# Patient Record
Sex: FEMALE | Race: WHITE | NOT HISPANIC OR LATINO | Employment: UNEMPLOYED | URBAN - METROPOLITAN AREA
[De-identification: names, ages, dates, MRNs, and addresses within clinical notes are randomized per-mention and may not be internally consistent; named-entity substitution may affect disease eponyms.]

---

## 2023-10-16 ENCOUNTER — TELEPHONE (OUTPATIENT)
Dept: GASTROENTEROLOGY | Facility: HOSPITAL | Age: 63
End: 2023-10-16

## 2024-01-08 PROBLEM — R42 DIZZINESS: Status: ACTIVE | Noted: 2022-06-17

## 2024-01-08 PROBLEM — R74.01 TRANSAMINITIS: Status: ACTIVE | Noted: 2022-06-17

## 2024-01-08 PROBLEM — M48.062 LUMBAR STENOSIS WITH NEUROGENIC CLAUDICATION: Status: ACTIVE | Noted: 2023-02-08

## 2024-01-08 PROBLEM — E87.1 HYPONATREMIA: Status: ACTIVE | Noted: 2022-06-17

## 2024-01-08 PROBLEM — I85.00 ESOPHAGEAL VARICES (MULTI): Status: ACTIVE | Noted: 2022-10-04

## 2024-01-08 PROBLEM — R00.1 BRADYCARDIA: Status: ACTIVE | Noted: 2022-06-17

## 2024-01-08 PROBLEM — R07.9 CHEST PAIN: Status: ACTIVE | Noted: 2022-06-17

## 2024-01-08 PROBLEM — R91.1 PULMONARY NODULE: Status: ACTIVE | Noted: 2024-01-08

## 2024-01-08 PROBLEM — K70.30 ALCOHOLIC CIRRHOSIS (MULTI): Status: ACTIVE | Noted: 2022-06-14

## 2024-01-08 PROBLEM — M72.2 PLANTAR FASCIITIS, BILATERAL: Status: ACTIVE | Noted: 2021-07-30

## 2024-01-08 RX ORDER — RIFAXIMIN 550 MG/1
TABLET ORAL
COMMUNITY

## 2024-01-08 RX ORDER — LANOLIN ALCOHOL/MO/W.PET/CERES
100 CREAM (GRAM) TOPICAL DAILY
COMMUNITY
Start: 2011-04-29

## 2024-01-08 RX ORDER — POTASSIUM CHLORIDE 750 MG/1
10 TABLET, FILM COATED, EXTENDED RELEASE ORAL
COMMUNITY
Start: 2011-04-29

## 2024-01-08 RX ORDER — SPIRONOLACTONE 50 MG/1
50 TABLET, FILM COATED ORAL
COMMUNITY
Start: 2011-04-29

## 2024-01-08 RX ORDER — ASCORBIC ACID 500 MG
500 TABLET ORAL
COMMUNITY

## 2024-01-08 RX ORDER — RIBOFLAVIN (VITAMIN B2) 400 MG
TABLET ORAL
COMMUNITY
Start: 2021-07-13

## 2024-01-08 RX ORDER — LANOLIN ALCOHOL/MO/W.PET/CERES
CREAM (GRAM) TOPICAL EVERY 24 HOURS
COMMUNITY

## 2024-01-08 RX ORDER — PANTOPRAZOLE SODIUM 40 MG/1
40 TABLET, DELAYED RELEASE ORAL DAILY
COMMUNITY
Start: 2023-07-13

## 2024-01-08 RX ORDER — PAROXETINE 10 MG/1
TABLET, FILM COATED ORAL
COMMUNITY
Start: 2021-02-08

## 2024-01-09 ENCOUNTER — APPOINTMENT (OUTPATIENT)
Dept: GASTROENTEROLOGY | Facility: CLINIC | Age: 64
End: 2024-01-09
Payer: MEDICARE

## 2024-01-09 ENCOUNTER — OFFICE VISIT (OUTPATIENT)
Dept: GASTROENTEROLOGY | Facility: CLINIC | Age: 64
End: 2024-01-09
Payer: MEDICARE

## 2024-01-09 VITALS
WEIGHT: 145 LBS | OXYGEN SATURATION: 100 % | SYSTOLIC BLOOD PRESSURE: 125 MMHG | BODY MASS INDEX: 25.69 KG/M2 | HEART RATE: 56 BPM | DIASTOLIC BLOOD PRESSURE: 60 MMHG | HEIGHT: 63 IN

## 2024-01-09 DIAGNOSIS — K70.30 ALCOHOLIC CIRRHOSIS OF LIVER WITHOUT ASCITES (MULTI): ICD-10-CM

## 2024-01-09 PROCEDURE — 1159F MED LIST DOCD IN RCRD: CPT | Performed by: INTERNAL MEDICINE

## 2024-01-09 PROCEDURE — 99214 OFFICE O/P EST MOD 30 MIN: CPT | Performed by: INTERNAL MEDICINE

## 2024-01-09 PROCEDURE — 1126F AMNT PAIN NOTED NONE PRSNT: CPT | Performed by: INTERNAL MEDICINE

## 2024-01-09 RX ORDER — CALCIUM CARBONATE/VITAMIN D3 500-10/5ML
400 LIQUID (ML) ORAL DAILY
COMMUNITY

## 2024-01-09 NOTE — PATIENT INSTRUCTIONS
Try increasing the Kristalose to 3 times a day and let us know in a few weeks if that helps with the memory issues.  You can call [phone].    If you have issues getting your rifaximin, you can call the same number.    Get labs in 3 months and in 6 months.    Get an ultrasound in 6 months.    See me back in clinic in about 7 months.

## 2024-01-09 NOTE — PROGRESS NOTES
HEPATOLOGY RETURN PATIENT VISIT    January 9, 2024      Dr. Jose Antonio Zayas      Patient Name:     MARINA GABRIEL  Medical Record Number:  [id number]  YOB: 1960    Dear Dr. Zayas,    I had the pleasure of seeing Marina Gabriel for follow-up evaluation in the [facility] ([facility] office). History and physical examination was performed. Pertinent available laboratory, imaging, pathology results were reviewed.     History of Present Illness:   The patient is a 63 year old white female who is referred for follow-up evaluation of alcoholic cirrhosis.    The patient has a heavy alcohol history. At her peak, she was drinking 8-10 beers a day or 1-2 bottles of wine daily. She reports that in January 2015, she presented to [facility] with confusion, increased abdominal girth, and acute kidney injury. She required large-volume paracentesis, lactulose and rifaximin, and underwent upper endoscopy which revealed esophageal varices. By their report, her MELD score was 19. Apparently, despite medical therapy, she worsened. Eventually she was told that there was no hope and she was set up for hospice and palliative care.    However, over the next several months, she started to get better. She was maintained on diuretics. She was too ill to go to an IOP program. However she started going to AA meetings and got a sponsor and has been sober since January 2015.    At that point, her jaundice essentially resolved. She was maintained long-term on diuretics. Her encephalopathy was well-controlled with rifaximin and lactulose. She did have one episode with severe hyponatremia (sodium decreased to 107) which was apparently attributed to the diuretics. After adjustment in the diuretics, the hyponatremia resolved.    She has actually been able to come off of diuretics completely.     She apparently has had a spot in the left lobe of the liver which has not changed over the last year on  ultrasounds.    Even though she appeared dramatically better than she did one year earlier, she was told that she should check in with a liver transplant center to see if she needed transplant.  For these reasons, she saw me in initial consultation in the liver transplant clinic on 2/2/2016. At that time, we felt that she was too stable for liver transplant evaluation. We attempted to get old records on her and have her just follow-up with me in general Hepatology clinic.    I did speak with Dr. Brothers on 3/23/2016. He told me that he had done a screening EGD which revealed moderate-sized esophageal varies which he banded. After the EGD, she developed chest pain and dysphagia. She had imaging studies of the chest done and she was noted to have a lung lesion and was seen by Pulmonary.  Eventually she had a biopsy of the lesion which was found to be benign.  She has been getting intermittent pulmonary imaging and following-up with her pulmonologist. She was told that her 2020 imaging studies showed no changes.    Dr. Silver has been arranging follow-up imaging studies (see results below). We discussed her at Tumor Board on 7/9/2018. There were no worrisome lesions seen (other than very severe fibrosis and cirrhosis).  The recommendation was to get alpha-fetoprotein and ultrasound every 6 months.  The patient was not comfortable with that recommendation at that time.  We compromised with ultrasound alpha-fetoprotein and MRI alpha-fetoprotein alternating every 6 months if her insurance company would pay for this. She reported that her labs from 12/2018 showed a MELD of 6.    She continues to follow-up with Dr. Silver.  She reports that she had an EGD in August 2019 that showed varices.  She reports that she required 5 bands.  She did develop significant dysphagia and odynophagia after the procedure.  Repeat endoscopy in September 2019 showed ulcerations at the site of the previous banding but no active varices.  The  endoscopy apparently also revealed food in the stomach and she was diagnosed with gastroparesis (although she had no gastroparesis symptoms).  She was placed on Reglan and was tolerating it well.  She reportedly had another endoscopy in April 2020 which showed small varices that did not need to be banded.    She has remained on rifaximin for several years.  She had gotten tapered off of her lactulose.  However, over the last few months, she has noted some issues with her memory.  Because of that, she was started on Kristalose twice daily in addition to her rifaximin twice daily.  After that, she felt that her memory issues resolved.  She is having several watery bowel movements every morning, but rarely any in the afternoon.    She was placed on nadolol due to the esophageal varices.  She developed significant bradycardia and the nadolol was held.  It was eventually restarted at a lower dose (10 mg daily).  She was eventually switched over to Coreg 6.25 mg once daily and is tolerating that well, by her cardiologist.    She returned today for follow-up evaluation.  She feels that her fatigue and confusion have worsened somewhat.  She is taking lactulose/Kristalose twice daily and rifaximin twice daily.  She has had no gastrointestinal bleeding.  She remains sober from alcohol for several years. She has some fatigue but is sleeping better.  She is not on any diuretics.      Past Medical/Surgical History:   Alcoholic cirrhosis, rosacea, carpal tunnel syndrome, disc disease, headaches, foot injury, status post left trigger finger repair.    Family History:   Father had leukemia and skin CA. GF had CVA. Mom had DM and CAD. There is no known family history of liver disease or cirrhosis.    Social History:   She is a former smoker. She smoked up to 2 ppd but stopped in 2015. She has a history of heavy alcohol use, as noted above.  She has been sober from alcohol since 2015.  She denied blood transfusions or intravenous  drug use. She does have 1 tattoo. She is .    Review of systems: As noted above. She does report insomnia and fatigue.  This has improved with Ambien.  She has had the memory issues as noted above.  fever, chills, weight loss, visual changes, auditory changes, shortness of breath, chest pain, abdominal pain, GI bleeding, diarrhea, constipation, depression, dysuria, hematuria, musculoskeletal issues, or rash.     Medical, Surgical, Family, and Social Histories  No past medical history on file.    Past Surgical History:   Procedure Laterality Date    OTHER SURGICAL HISTORY  03/28/2016    Ovarian Cystectomy Left       No family history on file.    Social History     Socioeconomic History    Marital status:      Spouse name: Not on file    Number of children: Not on file    Years of education: Not on file    Highest education level: Not on file   Occupational History    Not on file   Tobacco Use    Smoking status: Not on file    Smokeless tobacco: Not on file   Substance and Sexual Activity    Alcohol use: Not on file    Drug use: Not on file    Sexual activity: Not on file   Other Topics Concern    Not on file   Social History Narrative    Not on file     Social Determinants of Health     Financial Resource Strain: Not on file   Food Insecurity: Not on file   Transportation Needs: Not on file   Physical Activity: Not on file   Stress: Not on file   Social Connections: Not on file   Intimate Partner Violence: Not on file   Housing Stability: Not on file       Allergies and Current Medications  Allergies   Allergen Reactions    Levofloxacin Rash     Other reaction(s): pain in body    Sulfamethoxazole-Trimethoprim Hives, Itching and Rash     Other reaction(s): serum sickness     Current Outpatient Medications   Medication Sig Dispense Refill    pantoprazole (ProtoNix) 40 mg EC tablet Take 1 tablet (40 mg) by mouth once daily.      PARoxetine (Paxil) 10 mg tablet Take by mouth.      potassium chloride CR 10  "mEq ER tablet Take 1 tablet (10 mEq) by mouth once daily.      riboflavin (Vitamin B-2) 400 mg tablet Take by mouth.      spironolactone (Aldactone) 50 mg tablet Take 1 tablet (50 mg) by mouth once daily.      thiamine (Vitamin B-1) 100 mg tablet Take 1 tablet (100 mg) by mouth once daily.      ascorbic acid (Vitamin C) 500 mg tablet Take 1 tablet (500 mg) by mouth once daily.      magnesium oxide 400 mg magnesium capsule Take 1 capsule (400 mg) by mouth once daily.      thiamine 100 mg tablet once every 24 hours.      Xifaxan 550 mg tablet Oral for 15       No current facility-administered medications for this visit.        Physical Exam  /60   Pulse 56   Ht 1.6 m (5' 3\")   Wt 65.8 kg (145 lb)   SpO2 100%   BMI 25.69 kg/m²        Physical Examination:   General Appearance: alert and in no acute distress.   HEENT: oropharynx without lesions. Anicteric sclerae.   Neck supple, nontender, without adenopathy, thyromegaly, or JVD.   Lungs clear to auscultation and percussion.   Heart RRR without murmurs, rubs, or gallops.   Abdomen: Soft, nontender, bowel sounds positive, without obvious ascites. Liver and spleen not palpable.  Extremities full ROM, no atrophy, normal strength. No edema.    Skin no specific lesions.   Neurological exam nonfocal, alert and oriented. No asterixis.  She does have some diffuse spider angiomata, as well as bilateral palmar erythema.     Labs 11/13/2023 sodium 138, creatinine 0.68, glucose 89, protein 7.9, albumin 4.3, alk phos 159, bilirubin 0.9, AST 78, ALT 74, INR 1.    Labs 8/7/2023 alk phos 140, AST 96, ALT 75, AFP 3.    Labs 10/3/2022 WBC 3.7, hemoglobin 13.5, platelets 178, INR 1, glucose 98, sodium 139, creatinine 0.64, protein 7.8, albumin 4.4, alk phos 144, bilirubin 0.6, AST 82, ALT 62, AFP 2.    Labs 7/11/2022 WBC 3.7, hemoglobin 12.9, platelets 133, INR 1.1, sodium 138, glucose 101, creatinine 0.7, protein 7.4, albumin 4.3, alk phos 107, bilirubin 0.7, AST 56, ALT 83, " AFP 2.    Labs 4/1/2022 AST 55, ALT 40.    Labs 1/11/2022 WBC 3.3, hemoglobin 11.5, platelets 138, INR 1.1, glucose 89, sodium 137, creatinine 0.63, protein 6.9, albumin 3.8, alk phos 138, bilirubin 0.3, AST 41, ALT 32.    Labs 5/3/2021 WBC 2.5, hemoglobin 13.2, platelets 156, INR 1.1, sodium 129, potassium 4.8, creatinine 0.52, protein 7.6, albumin 4.5, alk phos 156, bilirubin 0.5, AST 41, ALT 38, AFP 2.    Labs 2/2/2021 sodium 131, potassium 4.6, WBC 3.5    Labs 11/3/2020 INR 1.1, AFP < 4, WBC 5, hemoglobin 14.4, platelets 144, glucose 111, sodium 139, creatinine 0.64, protein 7.3, albumin 4.6, alk phos 93, bilirubin 0.8, AST 23, ALT 18.    Labs 4/2/2020 WBC 3.9, hemoglobin 14.6, platelets 157, INR 1.1, sodium 140, glucose 108, creatinine 0.74, protein 8, albumin 4.4, alkaline phosphatase 114, bilirubin 1.1, AST 46, ALT 29, AFP 3.    Labs 2/25/2020 WBC 3.1, hemoglobin 13.7, platelets 134, INR 1.1, glucose 109, sodium 143, creatinine 0.78, protein 7.7, albumin 4.3, alkaline phosphatase 125, bilirubin 0.6, AST 35, ALT 45.    Labs 6/18/2019 WBC 3.8, hemoglobin 14, platelets 135, INR 1.1, glucose 95, sodium 144, creatinine 0.7, protein 8, albumin 4.5, alkaline phosphatase 148, bilirubin 0.8, AST 41, ALT 37, AFP 3.    Labs 12/20/2018 WBC 3.3, Hgb 14, platelets 127, INR 1.1, glucose 109, creat 0.69, sodium 142, protein 7.5, albumin 4.2, alk phos 159, bili 0.7, AST 43, ALT 42, AFP 2.    Labs 8/13/2018 WBC 4.1, hemoglobin 14.7, platelets 122, INR 0.9, sodium 146, glucose 95, creatinine 0.77, albumin 4.5, protein 7.8, alkaline phosphatase 150, bilirubin 0.6, AST 40, ALT 59.    Labs 6/26/2018 CEA 5.1, CA 19-9 62.    Labs 5/15/2018 WBC 5, hemoglobin 13.7, platelets 128, INR 1.1, sodium 144, potassium 3.7, creatinine 0.72, protein 7.4, albumin 3.6, alkaline phosphatase 147, bilirubin 0.8, AST 30, ALT 42, hepatitis B surface antibody positive, hepatitis A antibody positive.    Labs 2/13/2018 AFP < 1, WBC 4.4, hemoglobin 14.1,  platelets 157, INR 1.1, glucose 95, creatinine 0.59, protein 7.6, albumin 4.2, alkaline phosphatase 161, bilirubin 0.6, AST 31, ALT 30.    Labs 7/11/2017 INR 1.1, glucose 80, creatinine 0.55, sodium 138, protein 7.5, albumin 4.1, alkaline phosphatase 157, bilirubin 1, AST 36, ALT 35, WBC 4.7, hemoglobin 14.4, platelets 130.    Labs 1/6/2017 HAV -, HBsAb -, protein 8.1, albumin 4.5, alk phos 148, bili 1.6, dbili 0.4, AST 44, ALT 42.    Labs 12/21/2016 alpha-fetoprotein 8, creatinine 0.72, sodium 142, platelets 132, WBC 5.9, hemoglobin 16.1, INR 1.1, protein 7.7, albumin 3.6, alkaline phosphatase 270, bilirubin 3.1, AST 45, ALT 55.    Labs 10/13/2016 WBC 4.2, hemoglobin 15.3, platelets 175, glucose 102, creatinine 0.5, sodium 145, protein 7.9, albumin 4.2, alkaline phosphatase 206, bilirubin 0.9, AST 31, ALT 37,    Labs 6/17/2016 sodium 145, potassium 3.9, glucose 106, creatinine 0.7.    Labs 3/28/2016 WBC 4.2, Hgb 14.6, platelets 151, INR 1.2, AFP 4, glucose 99, sodium 140, potassium 4.3, creatinine 0.55, protein 7.6, albumin 3.5, alkaline phosphatase 216, bilirubin 0.7, AST 50, ALT 49.    Labs 3/9/2016 creat 0.58, glucose 109, WBC 3.5, Hgb 13.8, platelets 127.      Ultrasound 11/10/2023:  Impression    Constellation of sonographic findings compatible with cirrhosis. Correlate with liver function tests and clinical parameters.  Stable gallbladder polyp measuring up to 0.5 cm. See guidelines below.  Otherwise, no acute sonographic process identified.      Ultrasound 5/26/2023 revealed coarse echogenic liver, no liver mass, gallbladder polyp 8 mm in size.    Ultrasound 6/21/2022 revealed cirrhosis, no definitive focal liver mass, probable small gallbladder polyp.    Ultrasound 11/16/2021 revealed cirrhosis, probable 7 mm gallbladder polyp, otherwise unremarkable right upper quadrant ultrasound.    MRI 5/19/2021:  IMPRESSION:  1.  Technically adequate examination although image degradation  related to motion and  shadowing artifact demonstrates findings  compatible with cirrhosis without MRI evidence of hepatoma.  2. Portal systemic venous collaterals including unchanged  paraesophageal varices measuring up to 5 mm.         MRI 4/7/2020:  IMPRESSION:  1. Hepatic cirrhosis with areas of confluent fibrosis. No enhancing  mass to suggest hepatocellular carcinoma.  2. Small hiatal hernia.    Ultrasound 2/25/2020 revealed an isoechoic masslike focus in the left hepatic lobe anteriorly not corresponding to the previously seen lesion, the previously seen lesion in the left hepatic lobe is not identified, 5 mm polypoid lesion in the gallbladder.    PET CT 9/12/2018 showed no obvious evidence of malignancy.    US 8/13/2018 revealed cirrhosis, otherwise unremarkable right upper quadrant ultrasound.    MRI 7/22/2019 revealed wedge-shaped signal abnormality with persistent enhancement in the right hepatic lobe unchanged compared to prior studies likely representing confluent fibrosis.  There is cirrhosis, sequela of portal hypertension and a dysplastic nodule in segment 2, and borderline dilated main pancreatic duct.    MRI with and without contrast 6/18/2018 revealed extensive hepatocellular disease with multiple regenerating nodules, an irregular area of altered signal with vague enhancement and capsular retraction in the anterolateral aspect of the right lobe (somewhat concerning for cholangiocarcinoma), abnormal signal of the anterior surface of the lateral segment of the left lobe of the liver.  There was also an addendum that was written.  It stated that a comparison was made to the study from 6/14/2017.  He reports that capsular lesion in the right anterolateral lobe was previously visualized was some capsular retraction.  The lesion measures 5.1 x 2.4 cm.  Interestingly, there was no comment on the MRI from June 14, 2017 about a 5.1 cm right sided lesion.    US 2/1/2018:  IMPRESSION:  Liver cirrhosis.     At least 1 immobile  echogenic focus in the gallbladder which is  adhered to the gallbladder wall without posterior acoustic shadowing,  likely representing gallbladder polyp. There is no evidence of  gallbladder wall thickening or pericholecystic fluid.    MRI 6/14/2017 revealed cirrhosis, splenomegaly, 1.5 cm anterior left lateral segment lesion similar to previous and remaining indeterminate, focal 1.5 cm area with early arterial enhancement in the anterior left hepatic lobe medial segment.    US 2/1/2017 revealed a normal gallbladder and biliary tree. There is cirrhosis. There is a stable regenerating nodule.    Chest CT 3/16/2016 revealed a faint 1.1 cm right lower lobe density.    MRI 6/22/2016:  IMPRESSION:  1. Cirrhosis without evidence of a lesion suspicious for   hepatocellular carcinoma.    MRI 2/24/2016 revealed cirrhosis, a 1.6 cm lesion in the anterior left lobe of the liver with an indeterminate appearance.    EGD 4/24/2023 showed grade 1 esophageal varices, erythematous gastric mucosa, normal duodenum.  No gastric varices were seen.    EGD 5/10/2021 revealed grade 1 esophageal varices with no stigmata of bleeding, normal stomach, normal duodenum.    EGD 9/2018 revealed medium-sized, non-bleeding esophageal varices.    EGD 1/12/2017 revealed medium-sized esophageal varices, portal hypertensive gastropathy and normal duodenum.    EGD 3/9/2016 revealed 3 columns of moderate-sized esophageal varices without stigmata of variceal bleeding, PHG, normal duodenum. The varices were banded.    Colonoscopy 8/27/2015 revealed a 3 mm TC polyp and internal hemorrhoids.    Pathology from colonoscopy 8/27/2015 revealed a tubular adenoma without evidence of high-grade dysplasia or malignancy.    PAP smear 7/2/2015 revealed normal cytology.    Mammogram 4/15/2015 normal.         Assessment/Plan:   Alcoholic cirrhosis  Liver abnormalities/lesions on imaging studies    The patient is referred to me for follow-up evaluation of alcoholic  cirrhosis as well as the liver lesions.     Back in January 2015, she presented with severely decompensated alcoholic liver disease. I suspect that she had degree of alcoholic hepatitis on top of her underlying alcoholic cirrhosis. At that time, she had multiple complications of her liver disease including ascites, jaundice, hepatic encephalopathy, and variceal bleeding.    After being sober for > 1 year, she improved from the complications of her liver disease. In fact, she has been able to come off of her diuretics. Also, with sobriety, her MELD score decreased significantly. Because of this, she has not needed to be considered for liver transplant.  Her MELD scores have been relatively low.    She has remained on Xifaxan.  She is back on lactulose/Kristalose now.  I told her that she can titrate this based on her symptoms and bowel movements.  In general, she takes it about twice a day.  Obviously, if her encephalopathy worsens, we may need to readdress our ideas about transplant options.  However, before considering that, we will increase the Kristalose to 3 times a day for a few weeks and see if she has improvement.    She is having some problems with the high copayment of rifaximin.  She is working with advocacy groups and even [facility] on this.    I did remind her that she needs to pay attention to her mental status.  If it does worsen, she will have to decide on whether she feels that it is safe to drive.  I reminded her that she could be held liable if she were to get into an accident.    She will need to continue to follow with you and her gastroenterologist (Dr. Silver).  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  Her 9/2018 EGD revealed medium-sized, non-bleeding varices. 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  However, apparently due to fatigue, her nadolol was decreased to 10 mg daily.  Interestingly, she was found to have varices again that required banding in August 2019.  By  the patient's report, repeat endoscopy around April 2020 showed small varices that did not require banding.  Repeat EGD in May 2021 and April 2023 showed small varices that did not require banding.  Repeat endoscopy in 2 to 3 years is reasonable.    As noted above, she is tolerating the very low-dose carvedilol for now.  It is possible that this is contributing to some of her fatigue.    She apparently had a small lesion in the left lobe of the liver on ultrasound. We reviewed her MRI of the liver with and without contrast from 2/24/2016 at our Tumor Board on 3/28/2016.  Our radiologist did not find a suspicious lesion.  We recommended repeat MRI with and without contrast as well as alpha-fetoprotein.  The repeat MRI did not reveal any suspicious lesions. We tried to get a follow-up chest and abdominal CT. Her insurance only allowed us to get an ultrasound which was stable.  However, the June 2017 MRI suggested one stable lesion and a possible new lesion.  The repeat MRI 6/18/2018 again suggested a right-sided lesion possibly consistent with cholangiocarcinoma (based on the outside radiology read). However, we formally discussed her at the [facility] Tumor Board on 7/9/2018. Our radiologist felt that there were no worrisome lesions seen (other than very severe fibrosis and cirrhosis).  The recommendation was to get alpha-fetoprotein and ultrasound every 6 months.  The patient was not comfortable with that recommendation at that time.  We compromised with ultrasound/alpha-fetoprotein and MRI/alpha-fetoprotein alternating every 6 months if her insurance company would pay for this. 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  However, she reports that her last MRI was refused by the insurance company.  For that reason, for now, we will do AFP and ultrasound every 6 months.    We reviewed her July 2019 MRI at our tumor board on 8/5/2019.  The radiologist again felt that the  findings were unchanged and there was nothing suspicious for hepatocellular carcinoma.  Given the fact that we have been following her imaging studies since 2016 and there have been no significant changes, I had recommended again that she go to ultrasound and alpha-fetoprotein every 6 months.  Unfortunately, every ultrasound that is done at [facility] keeps finding different lesions.  They again found another lesion on the ultrasound in February 2020.  She underwent an MRI on 4/7/2020 which did not reveal any worrisome lesions.  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  Her MRI in May 2021 did not show any worrisome lesions. She should continue to get liver imaging every 6 months.  Normally, as noted above, I would just do ultrasound every 6 months.  She can work with Dr. Silver about possibly getting alternate ultrasound and MRI at 6 month intervals as noted above.    She does have a gallbladder polyp which has been relatively stable. We can continue to follow this with the imaging studies.    She had followed up with her pulmonologist about imaging of her chest for the possible nodule.  She was told that the lung lesion is stable and does not need further evaluation.    She is immune to hepatitis A and hepatitis B.    She can get labs every 3 months and see me back every 7 months.      Thank you for allowing me to participate in the care of this patient. Please feel free to contact me with any questions regarding their care.     Sincerely,     William Burns MD, FAASLD, FACG.  Medical Director, Hepatology  [facility]  [facility]    Professor of Medicine  Division of Gastroenterology and Liver Disease  [employer] [facility] [employer] [facility] [employer]    [address] [place] [id number]  Phone: [phone]  Fax: [phone].      Cc: Dr. Lee Silver  cc: Dr. Fransisco Marie        This document was generated with a computerized  dictation system. Because of  this, there could be errors in grammar and/or content.

## 2024-03-19 ENCOUNTER — TELEPHONE (OUTPATIENT)
Dept: GASTROENTEROLOGY | Facility: CLINIC | Age: 64
End: 2024-03-19
Payer: MEDICARE

## 2024-03-19 DIAGNOSIS — K76.82 HEPATIC ENCEPHALOPATHY (MULTI): ICD-10-CM

## 2024-08-05 RX ORDER — FUROSEMIDE 20 MG/1
20 TABLET ORAL
COMMUNITY
Start: 2011-04-29 | End: 2024-08-06 | Stop reason: ALTCHOICE

## 2024-08-05 RX ORDER — ACETAMINOPHEN 500 MG/1
CAPSULE, LIQUID FILLED ORAL
COMMUNITY

## 2024-08-05 RX ORDER — MULTIVITAMIN
TABLET ORAL
COMMUNITY

## 2024-08-05 RX ORDER — LIFITEGRAST 50 MG/ML
1 SOLUTION/ DROPS OPHTHALMIC 2 TIMES DAILY
COMMUNITY

## 2024-08-05 RX ORDER — ACETAMINOPHEN, DEXTROMETHORPHAN HBR, DOXYLAMINE SUCCINATE, PHENYLEPHRINE HCL 650; 20; 12.5; 1 MG/30ML; MG/30ML; MG/30ML; MG/30ML
SOLUTION ORAL EVERY 24 HOURS
COMMUNITY

## 2024-08-05 RX ORDER — LORATADINE 10 MG/1
10 TABLET ORAL
COMMUNITY
Start: 2011-04-29

## 2024-08-05 RX ORDER — GUARANA 1000 MG
TABLET ORAL
COMMUNITY

## 2024-08-05 RX ORDER — ZINC GLUCONATE 50 MG
TABLET ORAL EVERY 24 HOURS
COMMUNITY

## 2024-08-05 RX ORDER — ALBUTEROL SULFATE 90 UG/1
INHALANT RESPIRATORY (INHALATION)
COMMUNITY
Start: 2020-07-23 | End: 2024-08-06 | Stop reason: ALTCHOICE

## 2024-08-05 RX ORDER — CARVEDILOL 6.25 MG/1
6.25 TABLET ORAL DAILY
COMMUNITY

## 2024-08-05 RX ORDER — CELECOXIB 200 MG/1
200 CAPSULE ORAL
COMMUNITY
Start: 2024-02-13 | End: 2024-08-06 | Stop reason: ALTCHOICE

## 2024-08-05 RX ORDER — CLOBETASOL PROPIONATE 0.5 MG/ML
SOLUTION TOPICAL
COMMUNITY
End: 2024-08-06 | Stop reason: ALTCHOICE

## 2024-08-05 RX ORDER — TIOTROPIUM BROMIDE 18 UG/1
CAPSULE ORAL; RESPIRATORY (INHALATION)
COMMUNITY
End: 2024-08-06 | Stop reason: ALTCHOICE

## 2024-08-05 RX ORDER — PSEUDOEPHEDRINE HCL 120 MG
TABLET, EXTENDED RELEASE ORAL
COMMUNITY

## 2024-08-05 RX ORDER — TRAMADOL HYDROCHLORIDE 50 MG/1
TABLET ORAL
COMMUNITY

## 2024-08-05 RX ORDER — APREMILAST 30 MG/1
TABLET, FILM COATED ORAL
COMMUNITY
End: 2024-08-06 | Stop reason: ALTCHOICE

## 2024-08-05 RX ORDER — OMEPRAZOLE 40 MG/1
40 CAPSULE, DELAYED RELEASE ORAL
COMMUNITY
Start: 2016-12-20 | End: 2024-08-06 | Stop reason: ALTCHOICE

## 2024-08-05 RX ORDER — CALCIUM CARBONATE/VITAMIN D3 500 MG-2.5
TABLET,CHEWABLE ORAL
COMMUNITY
Start: 2021-07-13

## 2024-08-05 RX ORDER — FOLIC ACID 1 MG/1
1 TABLET ORAL DAILY
COMMUNITY

## 2024-08-05 RX ORDER — DULOXETIN HYDROCHLORIDE 60 MG/1
60 CAPSULE, DELAYED RELEASE ORAL DAILY
COMMUNITY
Start: 2023-10-09 | End: 2024-08-06 | Stop reason: ALTCHOICE

## 2024-08-05 RX ORDER — HYDROXYZINE HYDROCHLORIDE 25 MG/1
25 TABLET, FILM COATED ORAL 4 TIMES DAILY PRN
COMMUNITY
End: 2024-08-06 | Stop reason: ALTCHOICE

## 2024-08-05 RX ORDER — FLUTICASONE PROPIONATE 50 MCG
SPRAY, SUSPENSION (ML) NASAL
COMMUNITY

## 2024-08-05 RX ORDER — BUPRENORPHINE 10 UG/H
PATCH TRANSDERMAL
COMMUNITY
Start: 2023-08-13 | End: 2024-08-06 | Stop reason: ALTCHOICE

## 2024-08-05 RX ORDER — ZOLPIDEM TARTRATE 10 MG/1
1 TABLET ORAL NIGHTLY PRN
COMMUNITY
Start: 2021-07-13 | End: 2024-08-06 | Stop reason: ALTCHOICE

## 2024-08-05 RX ORDER — ZALEPLON 10 MG/1
10 CAPSULE ORAL NIGHTLY
COMMUNITY
End: 2024-08-06 | Stop reason: ALTCHOICE

## 2024-08-05 RX ORDER — FLUTICASONE FUROATE AND VILANTEROL TRIFENATATE 100; 25 UG/1; UG/1
1 POWDER RESPIRATORY (INHALATION) DAILY
COMMUNITY

## 2024-08-05 RX ORDER — ESCITALOPRAM OXALATE 10 MG/1
10 TABLET ORAL DAILY
COMMUNITY
End: 2024-08-06 | Stop reason: ALTCHOICE

## 2024-08-05 RX ORDER — IBUPROFEN 600 MG/1
600 TABLET ORAL 2 TIMES DAILY
COMMUNITY
Start: 2023-12-28 | End: 2024-08-06 | Stop reason: ALTCHOICE

## 2024-08-05 RX ORDER — BUPROPION HYDROCHLORIDE 150 MG/1
TABLET, EXTENDED RELEASE ORAL
COMMUNITY
Start: 2020-12-15 | End: 2024-08-06 | Stop reason: ALTCHOICE

## 2024-08-06 ENCOUNTER — OFFICE VISIT (OUTPATIENT)
Dept: GASTROENTEROLOGY | Facility: CLINIC | Age: 64
End: 2024-08-06
Payer: MEDICARE

## 2024-08-06 VITALS
DIASTOLIC BLOOD PRESSURE: 80 MMHG | SYSTOLIC BLOOD PRESSURE: 127 MMHG | WEIGHT: 141.2 LBS | BODY MASS INDEX: 25.02 KG/M2 | OXYGEN SATURATION: 99 % | HEIGHT: 63 IN | HEART RATE: 50 BPM

## 2024-08-06 DIAGNOSIS — K70.30 ALCOHOLIC CIRRHOSIS OF LIVER WITHOUT ASCITES (MULTI): Primary | ICD-10-CM

## 2024-08-06 DIAGNOSIS — I85.00 VARICES OF ESOPHAGUS DETERMINED BY ENDOSCOPY (MULTI): ICD-10-CM

## 2024-08-06 DIAGNOSIS — K76.82 HEPATIC ENCEPHALOPATHY (MULTI): ICD-10-CM

## 2024-08-06 PROCEDURE — 99215 OFFICE O/P EST HI 40 MIN: CPT | Performed by: INTERNAL MEDICINE

## 2024-08-06 PROCEDURE — 1036F TOBACCO NON-USER: CPT | Performed by: INTERNAL MEDICINE

## 2024-08-06 PROCEDURE — 1159F MED LIST DOCD IN RCRD: CPT | Performed by: INTERNAL MEDICINE

## 2024-08-06 PROCEDURE — 3008F BODY MASS INDEX DOCD: CPT | Performed by: INTERNAL MEDICINE

## 2024-08-06 PROCEDURE — 1126F AMNT PAIN NOTED NONE PRSNT: CPT | Performed by: INTERNAL MEDICINE

## 2024-08-06 RX ORDER — LACTULOSE 10 G/15ML
30 SOLUTION ORAL
COMMUNITY

## 2024-08-06 ASSESSMENT — ENCOUNTER SYMPTOMS
OCCASIONAL FEELINGS OF UNSTEADINESS: 0
DEPRESSION: 0
LOSS OF SENSATION IN FEET: 0

## 2024-08-06 ASSESSMENT — PATIENT HEALTH QUESTIONNAIRE - PHQ9
1. LITTLE INTEREST OR PLEASURE IN DOING THINGS: NOT AT ALL
SUM OF ALL RESPONSES TO PHQ9 QUESTIONS 1 AND 2: 0
2. FEELING DOWN, DEPRESSED OR HOPELESS: NOT AT ALL

## 2024-08-06 ASSESSMENT — COLUMBIA-SUICIDE SEVERITY RATING SCALE - C-SSRS
2. HAVE YOU ACTUALLY HAD ANY THOUGHTS OF KILLING YOURSELF?: NO
1. IN THE PAST MONTH, HAVE YOU WISHED YOU WERE DEAD OR WISHED YOU COULD GO TO SLEEP AND NOT WAKE UP?: NO
6. HAVE YOU EVER DONE ANYTHING, STARTED TO DO ANYTHING, OR PREPARED TO DO ANYTHING TO END YOUR LIFE?: NO

## 2024-08-06 ASSESSMENT — PAIN SCALES - GENERAL: PAINLEVEL: 0-NO PAIN

## 2024-08-06 NOTE — PROGRESS NOTES
HEPATOLOGY RETURN PATIENT VISIT    August 6, 2024      Dr. Jose Antonio Zayas      Patient Name:     MARINA GABRIEL  Medical Record Number:  [id number]  YOB: 1960    Dear Dr. Zayas,    I had the pleasure of seeing Marina Gabriel for follow-up evaluation in the [facility] ([facility] office). History and physical examination was performed. Pertinent available laboratory, imaging, pathology results were reviewed.     History of Present Illness:   The patient is a 63 year old white female who is referred for follow-up evaluation of alcoholic cirrhosis.    The patient has a heavy alcohol history. At her peak, she was drinking 8-10 beers a day or 1-2 bottles of wine daily. She reports that in January 2015, she presented to [facility] with confusion, increased abdominal girth, and acute kidney injury. She required large-volume paracentesis, lactulose and rifaximin, and underwent upper endoscopy which revealed esophageal varices. By their report, her MELD score was 19. Apparently, despite medical therapy, she worsened. Eventually she was told that there was no hope and she was set up for hospice and palliative care.    However, over the next several months, she started to get better. She was maintained on diuretics. She was too ill to go to an IOP program. However she started going to AA meetings and got a sponsor and has been sober since January 2015.    At that point, her jaundice essentially resolved. She was maintained long-term on diuretics. Her encephalopathy was well-controlled with rifaximin and lactulose. She did have one episode with severe hyponatremia (sodium decreased to 107) which was apparently attributed to the diuretics. After adjustment in the diuretics, the hyponatremia resolved.    She has actually been able to come off of diuretics completely.     She apparently has had a spot in the left lobe of the liver which has not changed over the last year on  ultrasounds.    Even though she appeared dramatically better than she did one year earlier, she was told that she should check in with a liver transplant center to see if she needed transplant.  For these reasons, she saw me in initial consultation in the liver transplant clinic on 2/2/2016. At that time, we felt that she was too stable for liver transplant evaluation. We attempted to get old records on her and have her just follow-up with me in general Hepatology clinic.    I did speak with Dr. Brothers on 3/23/2016. He told me that he had done a screening EGD which revealed moderate-sized esophageal varies which he banded. After the EGD, she developed chest pain and dysphagia. She had imaging studies of the chest done and she was noted to have a lung lesion and was seen by Pulmonary.  Eventually she had a biopsy of the lesion which was found to be benign.  She has been getting intermittent pulmonary imaging and following-up with her pulmonologist. She was told that her 2020 imaging studies showed no changes.    Dr. Silver has been arranging follow-up imaging studies (see results below). We discussed her at [facility] on 7/9/2018. There were no worrisome lesions seen (other than very severe fibrosis and cirrhosis).  The recommendation was to get alpha-fetoprotein and ultrasound every 6 months.  The patient was not comfortable with that recommendation at that time.  We compromised with ultrasound alpha-fetoprotein and MRI alpha-fetoprotein alternating every 6 months if her insurance company would pay for this. She reported that her labs from 12/2018 showed a MELD of 6.    She continues to follow-up with Dr. Silver.  She reports that she had an EGD in August 2019 that showed varices.  She reports that she required 5 bands.  She did develop significant dysphagia and odynophagia after the procedure.  Repeat endoscopy in September 2019 showed ulcerations at the site of the previous banding but no active varices.  The  endoscopy apparently also revealed food in the stomach and she was diagnosed with gastroparesis (although she had no gastroparesis symptoms).  She was placed on Reglan and was tolerating it well.  She reportedly had another endoscopy in April 2020 which showed small varices that did not need to be banded.    She has remained on rifaximin for several years.  She had gotten tapered off of her lactulose.  However, over the last few months, she has noted some issues with her memory.  Because of that, she was started on Kristalose twice daily in addition to her rifaximin twice daily.  After that, she felt that her memory issues resolved.  She is having several watery bowel movements every morning, but rarely any in the afternoon.    She has developed some point pains and swelling and is now being evaluated for possible rheumatoid arthritis.    She was placed on nadolol due to the esophageal varices.  She developed significant bradycardia and the nadolol was held.  It was eventually restarted at a lower dose (10 mg daily).  She was eventually switched over to Coreg 6.25 mg once daily and is tolerating that well, by her cardiologist.    She returned today for follow-up evaluation.  She feels that her fatigue and confusion have worsened at times.  She is taking lactulose/Kristalose twice daily and rifaximin twice daily.  Her dose was adjusted by her local GI provider.  She was apparently having more bloating on 3 times a day and did not notice any improvement in her mental status.  She has had no gastrointestinal bleeding.  She remains sober from alcohol for several years. She has some fatigue but is sleeping better.  She is not on any diuretics.      Past Medical/Surgical History:   Alcoholic cirrhosis, rosacea, carpal tunnel syndrome, disc disease, headaches, foot injury, status post left trigger finger repair, concern for rheumatoid arthritis.    Family History:   Father had leukemia and skin CA. GF had CVA. Mom had DM  and CAD. There is no known family history of liver disease or cirrhosis.    Social History:   She is a former smoker. She smoked up to 2 ppd but stopped in 2015. She has a history of heavy alcohol use, as noted above.  She has been sober from alcohol since 2015.  She denied blood transfusions or intravenous drug use. She does have 1 tattoo. She is .    Review of systems: As noted above. She does report insomnia and fatigue.  This has improved with Ambien.  She has had the memory issues as noted above.  However, she feels that this has not changed in many years and may just be age rather than hepatic encephalopathy.  Fever, chills, weight loss, visual changes, auditory changes, shortness of breath, chest pain, abdominal pain, GI bleeding, diarrhea, constipation, depression, dysuria, hematuria, musculoskeletal issues, or rash.      Medical, Surgical, Family, and Social Histories  No past medical history on file.    Past Surgical History:   Procedure Laterality Date    OTHER SURGICAL HISTORY  03/28/2016    Ovarian Cystectomy Left       No family history on file.    Social History     Socioeconomic History    Marital status:      Spouse name: Not on file    Number of children: Not on file    Years of education: Not on file    Highest education level: Not on file   Occupational History    Not on file   Tobacco Use    Smoking status: Never    Smokeless tobacco: Never   Substance and Sexual Activity    Alcohol use: Never    Drug use: Never    Sexual activity: Not on file   Other Topics Concern    Not on file   Social History Narrative    Not on file     Social Determinants of Health     Financial Resource Strain: Low Risk  (8/1/2024)    Received from [employer]    Overall Financial Resource Strain (CARDIA)     Difficulty of Paying Living Expenses: Not hard at all   Food Insecurity: No Food Insecurity (8/1/2024)    Received from [employer]    Hunger Vital Sign     Worried About Running Out of Food in the  Last Year: Never true     Ran Out of Food in the Last Year: Never true   Transportation Needs: No Transportation Needs (8/1/2024)    Received from [employer] [facility]    PRAPARE - Transportation     Lack of Transportation (Medical): No     Lack of Transportation (Non-Medical): No   Physical Activity: Sufficiently Active (8/1/2024)    Received from [facility]    Exercise Vital Sign     Days of Exercise per Week: 7 days     Minutes of Exercise per Session: 60 min   Stress: Stress Concern Present (8/1/2024)    Received from [employer] [facility]    [employer] [place] [employer] - Occupational Stress Questionnaire     Feeling of Stress : To some extent   Social Connections: Socially Integrated (8/1/2024)    Received from [employer] [facility]    Social Connection and Isolation Panel [NHANES]     Frequency of Communication with Friends and Family: More than three times a week     Frequency of Social Gatherings with Friends and Family: More than three times a week     Attends [employer] Services: More than 4 times per year     Active Member of Clubs or Organizations: Yes     Attends Club or Organization Meetings: More than 4 times per year     Marital Status:    Intimate Partner Violence: Not At Risk (8/1/2024)    Received from [facility]    Humiliation, Afraid, Rape, and Kick questionnaire     Fear of Current or Ex-Partner: No     Emotionally Abused: No     Physically Abused: No     Sexually Abused: No   Housing Stability: Low Risk  (8/1/2024)    Received from [facility]    Housing Stability Vital Sign     Unable to Pay for Housing in the Last Year: No     Number of Times Moved in the Last Year: 0     Homeless in the Last Year: No       Allergies and Current Medications  Allergies   Allergen Reactions    Levofloxacin Rash     Other reaction(s): pain in body    Sulfamethoxazole-Trimethoprim Hives, Itching and Rash     Other reaction(s): serum sickness     Current Outpatient Medications   Medication Sig Dispense Refill     "acetaminophen (Tylenol) 500 mg capsule Take by mouth.      ascorbic acid (Vitamin C) 500 mg tablet Take 1 tablet (500 mg) by mouth once daily.      Breo Ellipta 100-25 mcg/dose inhaler Inhale 1 puff once daily.      calcium carbonate-vitamin D3 500 mg-2.5 mcg (100 unit) tablet,chewable Chew.      carvedilol (Coreg) 6.25 mg tablet Take 1 tablet (6.25 mg) by mouth once daily.      cyanocobalamin, vitamin B-12, (Vitamin B-12) 1,000 mcg tablet extended release once every 24 hours.      fluticasone (Flonase) 50 mcg/actuation nasal spray INSTILL 1 SPRAY IN EACH NOSTRIL ONCE A DAY      folic acid (Folvite) 1 mg tablet Take 1 tablet (1 mg) by mouth once daily.      lactulose 20 gram/30 mL oral solution Take 30 mL (20 g) by mouth 4 times a day. Two 30 ml in the am, and two 30ml in the pm.      loratadine (Claritin) 10 mg tablet Take 1 tablet (10 mg) by mouth once daily.      magnesium aspart,citrate,oxide (Triple Magnesium Complex) 400 mg magnesium capsule Take by mouth once daily.      milk thistle seed extract 175 mg tablet as directed Orally      multivitamin tablet Take by mouth.      pantoprazole (ProtoNix) 40 mg EC tablet Take 1 tablet (40 mg) by mouth once daily.      riboflavin (Vitamin B-2) 400 mg tablet Take by mouth.      thiamine (Vitamin B-1) 100 mg tablet Take 1 tablet (100 mg) by mouth once daily.      traMADol (Ultram) 50 mg tablet TAKE ONE TABLET BY MOUTH 1-2 TIMES A DAY AS NEEDED FOR PAIN TO LAST 30 DAYS      Xifaxan 550 mg tablet Oral for 15      Xiidra 5 % dropperette Administer 1 drop into both eyes 2 times a day.      zinc gluconate 50 mg tablet once every 24 hours.      lactulose (Kristalose) 20 gram packet Take 1 packet (20 g) by mouth 3 times a day. (Patient not taking: Reported on 8/6/2024) 90 packet 6     No current facility-administered medications for this visit.        Physical Exam  /80 (BP Location: Left arm, Patient Position: Sitting, BP Cuff Size: Adult)   Pulse 50   Ht 1.6 m (5' 3\") "   Wt 64 kg (141 lb 3.2 oz)   SpO2 99%   BMI 25.01 kg/m²       Physical Examination:   General Appearance: alert and in no acute distress.   HEENT: oropharynx without lesions. Anicteric sclerae.   Neck supple, nontender, without adenopathy, thyromegaly, or JVD.   Lungs clear to auscultation and percussion.   Heart RRR without murmurs, rubs, or gallops.   Abdomen: Soft, nontender, bowel sounds positive, without obvious ascites. Liver and spleen not palpable.  Extremities full ROM, no atrophy, normal strength. No edema.    Skin no specific lesions.   Neurological exam nonfocal, alert and oriented. No asterixis.  She does have some diffuse spider angiomata, as well as bilateral palmar erythema.     Labs 7/1/2024 WBC 3.6, hemoglobin 14.2, platelets 151, INR 1, glucose 97, sodium 141, creatinine 0.7, protein 8.1, albumin 4.4, alk phos 132, bilirubin 0.7, AST 57, ALT 51.    Labs 4/1/2024 AFP 3.    Labs 11/13/2023 sodium 138, creatinine 0.68, glucose 89, protein 7.9, albumin 4.3, alk phos 159, bilirubin 0.9, AST 78, ALT 74, INR 1.    Labs 8/7/2023 alk phos 140, AST 96, ALT 75, AFP 3.    Labs 10/3/2022 WBC 3.7, hemoglobin 13.5, platelets 178, INR 1, glucose 98, sodium 139, creatinine 0.64, protein 7.8, albumin 4.4, alk phos 144, bilirubin 0.6, AST 82, ALT 62, AFP 2.    Labs 7/11/2022 WBC 3.7, hemoglobin 12.9, platelets 133, INR 1.1, sodium 138, glucose 101, creatinine 0.7, protein 7.4, albumin 4.3, alk phos 107, bilirubin 0.7, AST 56, ALT 83, AFP 2.    Labs 4/1/2022 AST 55, ALT 40.    Labs 1/11/2022 WBC 3.3, hemoglobin 11.5, platelets 138, INR 1.1, glucose 89, sodium 137, creatinine 0.63, protein 6.9, albumin 3.8, alk phos 138, bilirubin 0.3, AST 41, ALT 32.    Labs 5/3/2021 WBC 2.5, hemoglobin 13.2, platelets 156, INR 1.1, sodium 129, potassium 4.8, creatinine 0.52, protein 7.6, albumin 4.5, alk phos 156, bilirubin 0.5, AST 41, ALT 38, AFP 2.    Labs 2/2/2021 sodium 131, potassium 4.6, WBC 3.5    Labs 11/3/2020 INR 1.1,  AFP < 4, WBC 5, hemoglobin 14.4, platelets 144, glucose 111, sodium 139, creatinine 0.64, protein 7.3, albumin 4.6, alk phos 93, bilirubin 0.8, AST 23, ALT 18.    Labs 4/2/2020 WBC 3.9, hemoglobin 14.6, platelets 157, INR 1.1, sodium 140, glucose 108, creatinine 0.74, protein 8, albumin 4.4, alkaline phosphatase 114, bilirubin 1.1, AST 46, ALT 29, AFP 3.    Labs 2/25/2020 WBC 3.1, hemoglobin 13.7, platelets 134, INR 1.1, glucose 109, sodium 143, creatinine 0.78, protein 7.7, albumin 4.3, alkaline phosphatase 125, bilirubin 0.6, AST 35, ALT 45.    Labs 6/18/2019 WBC 3.8, hemoglobin 14, platelets 135, INR 1.1, glucose 95, sodium 144, creatinine 0.7, protein 8, albumin 4.5, alkaline phosphatase 148, bilirubin 0.8, AST 41, ALT 37, AFP 3.    Labs 12/20/2018 WBC 3.3, Hgb 14, platelets 127, INR 1.1, glucose 109, creat 0.69, sodium 142, protein 7.5, albumin 4.2, alk phos 159, bili 0.7, AST 43, ALT 42, AFP 2.    Labs 8/13/2018 WBC 4.1, hemoglobin 14.7, platelets 122, INR 0.9, sodium 146, glucose 95, creatinine 0.77, albumin 4.5, protein 7.8, alkaline phosphatase 150, bilirubin 0.6, AST 40, ALT 59.    Labs 6/26/2018 CEA 5.1, CA 19-9 62.    Labs 5/15/2018 WBC 5, hemoglobin 13.7, platelets 128, INR 1.1, sodium 144, potassium 3.7, creatinine 0.72, protein 7.4, albumin 3.6, alkaline phosphatase 147, bilirubin 0.8, AST 30, ALT 42, hepatitis B surface antibody positive, hepatitis A antibody positive.    Labs 2/13/2018 AFP < 1, WBC 4.4, hemoglobin 14.1, platelets 157, INR 1.1, glucose 95, creatinine 0.59, protein 7.6, albumin 4.2, alkaline phosphatase 161, bilirubin 0.6, AST 31, ALT 30.    Labs 7/11/2017 INR 1.1, glucose 80, creatinine 0.55, sodium 138, protein 7.5, albumin 4.1, alkaline phosphatase 157, bilirubin 1, AST 36, ALT 35, WBC 4.7, hemoglobin 14.4, platelets 130.    Labs 1/6/2017 HAV -, HBsAb -, protein 8.1, albumin 4.5, alk phos 148, bili 1.6, dbili 0.4, AST 44, ALT 42.    Labs 12/21/2016 alpha-fetoprotein 8, creatinine  0.72, sodium 142, platelets 132, WBC 5.9, hemoglobin 16.1, INR 1.1, protein 7.7, albumin 3.6, alkaline phosphatase 270, bilirubin 3.1, AST 45, ALT 55.    Labs 10/13/2016 WBC 4.2, hemoglobin 15.3, platelets 175, glucose 102, creatinine 0.5, sodium 145, protein 7.9, albumin 4.2, alkaline phosphatase 206, bilirubin 0.9, AST 31, ALT 37,    Labs 6/17/2016 sodium 145, potassium 3.9, glucose 106, creatinine 0.7.    Labs 3/28/2016 WBC 4.2, Hgb 14.6, platelets 151, INR 1.2, AFP 4, glucose 99, sodium 140, potassium 4.3, creatinine 0.55, protein 7.6, albumin 3.5, alkaline phosphatase 216, bilirubin 0.7, AST 50, ALT 49.    Labs 3/9/2016 creat 0.58, glucose 109, WBC 3.5, Hgb 13.8, platelets 127.      Ultrasound 4/1/2024 revealed cirrhosis, no liver mass, trace ascites, gallbladder wall thickening.    Ultrasound 11/10/2023:  Impression    Constellation of sonographic findings compatible with cirrhosis. Correlate with liver function tests and clinical parameters.  Stable gallbladder polyp measuring up to 0.5 cm. See guidelines below.  Otherwise, no acute sonographic process identified.      Ultrasound 5/26/2023 revealed coarse echogenic liver, no liver mass, gallbladder polyp 8 mm in size.    Ultrasound 6/21/2022 revealed cirrhosis, no definitive focal liver mass, probable small gallbladder polyp.    Ultrasound 11/16/2021 revealed cirrhosis, probable 7 mm gallbladder polyp, otherwise unremarkable right upper quadrant ultrasound.    MRI 5/19/2021:  IMPRESSION:  1.  Technically adequate examination although image degradation  related to motion and shadowing artifact demonstrates findings  compatible with cirrhosis without MRI evidence of hepatoma.  2. Portal systemic venous collaterals including unchanged  paraesophageal varices measuring up to 5 mm.         MRI 4/7/2020:  IMPRESSION:  1. Hepatic cirrhosis with areas of confluent fibrosis. No enhancing  mass to suggest hepatocellular carcinoma.  2. Small hiatal hernia.    Ultrasound  2/25/2020 revealed an isoechoic masslike focus in the left hepatic lobe anteriorly not corresponding to the previously seen lesion, the previously seen lesion in the left hepatic lobe is not identified, 5 mm polypoid lesion in the gallbladder.    PET CT 9/12/2018 showed no obvious evidence of malignancy.    US 8/13/2018 revealed cirrhosis, otherwise unremarkable right upper quadrant ultrasound.    MRI 7/22/2019 revealed wedge-shaped signal abnormality with persistent enhancement in the right hepatic lobe unchanged compared to prior studies likely representing confluent fibrosis.  There is cirrhosis, sequela of portal hypertension and a dysplastic nodule in segment 2, and borderline dilated main pancreatic duct.    MRI with and without contrast 6/18/2018 revealed extensive hepatocellular disease with multiple regenerating nodules, an irregular area of altered signal with vague enhancement and capsular retraction in the anterolateral aspect of the right lobe (somewhat concerning for cholangiocarcinoma), abnormal signal of the anterior surface of the lateral segment of the left lobe of the liver.  There was also an addendum that was written.  It stated that a comparison was made to the study from 6/14/2017.  He reports that capsular lesion in the right anterolateral lobe was previously visualized was some capsular retraction.  The lesion measures 5.1 x 2.4 cm.  Interestingly, there was no comment on the MRI from June 14, 2017 about a 5.1 cm right sided lesion.    US 2/1/2018:  IMPRESSION:  Liver cirrhosis.     At least 1 immobile echogenic focus in the gallbladder which is  adhered to the gallbladder wall without posterior acoustic shadowing,  likely representing gallbladder polyp. There is no evidence of  gallbladder wall thickening or pericholecystic fluid.    MRI 6/14/2017 revealed cirrhosis, splenomegaly, 1.5 cm anterior left lateral segment lesion similar to previous and remaining indeterminate, focal 1.5 cm area  with early arterial enhancement in the anterior left hepatic lobe medial segment.    US 2/1/2017 revealed a normal gallbladder and biliary tree. There is cirrhosis. There is a stable regenerating nodule.    Chest CT 3/16/2016 revealed a faint 1.1 cm right lower lobe density.    MRI 6/22/2016:  IMPRESSION:  1. Cirrhosis without evidence of a lesion suspicious for   hepatocellular carcinoma.    MRI 2/24/2016 revealed cirrhosis, a 1.6 cm lesion in the anterior left lobe of the liver with an indeterminate appearance.    EGD 4/24/2023 showed grade 1 esophageal varices, erythematous gastric mucosa, normal duodenum.  No gastric varices were seen.    EGD 5/10/2021 revealed grade 1 esophageal varices with no stigmata of bleeding, normal stomach, normal duodenum.    EGD 9/2018 revealed medium-sized, non-bleeding esophageal varices.    EGD 1/12/2017 revealed medium-sized esophageal varices, portal hypertensive gastropathy and normal duodenum.    EGD 3/9/2016 revealed 3 columns of moderate-sized esophageal varices without stigmata of variceal bleeding, PHG, normal duodenum. The varices were banded.    Colonoscopy 8/27/2015 revealed a 3 mm TC polyp and internal hemorrhoids.    Pathology from colonoscopy 8/27/2015 revealed a tubular adenoma without evidence of high-grade dysplasia or malignancy.    PAP smear 7/2/2015 revealed normal cytology.    Mammogram 4/15/2015 normal.         Assessment/Plan:   Alcoholic cirrhosis  Liver abnormalities/lesions on imaging studies    The patient is referred to me for follow-up evaluation of alcoholic cirrhosis as well as the liver lesions.     Back in January 2015, she presented with severely decompensated alcoholic liver disease. I suspect that she had degree of alcoholic hepatitis on top of her underlying alcoholic cirrhosis. At that time, she had multiple complications of her liver disease including ascites, jaundice, hepatic encephalopathy, and variceal bleeding.    After being sober for >  1 year, she improved from the complications of her liver disease. In fact, she has been able to come off of her diuretics. Also, with sobriety, her MELD score decreased significantly. Because of this, she has not needed to be considered for liver transplant.  Her MELD scores have been relatively low.    She has remained on Xifaxan.  She is back on lactulose/Kristalose now.  I told her that she can titrate this based on her symptoms and bowel movements.  In general, she takes it about twice a day.  Obviously, if her encephalopathy worsens, we may need to readdress our ideas about transplant options.  In fact, when she took it up to 3 times a day she noticed no improvement in fatigue or confusion but did notice worsening bloating and her local gastroenterologist decreased to twice daily.  To take that 1 step further, I would question whether she actually has hepatic encephalopathy or not.  It is unusual to have excellent labs and have encephalopathy.  I told her that if she is interested she could do a trial of tapering herself down/off the lactulose to see if she even notices a difference.  I told her that she would need to discuss this with her  because they would need to follow her very closely to decide if/when she would need to be put back on the lactulose.  For now, I would keep her on the rifaximin.    She is aware that she needs to pay attention to her mental status.  If it does worsen, she will have to decide on whether she feels that it is safe to drive.  I reminded her that she could be held liable if she were to get into an accident.    From a transplant standpoint, her latest MELD score in July 2024 was only 7.  This would generally be too early for liver transplant consideration.    She will need to continue to follow with you and her gastroenterologist (Dr. Silver).  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  Her 9/2018 EGD revealed medium-sized, non-bleeding varices. Dr. Silver increased her  Nadolol at that point.  However, apparently due to fatigue, her nadolol was decreased to 10 mg daily.  Interestingly, she was found to have varices again that required banding in August 2019.  By the patient's report, repeat endoscopy around April 2020 showed small varices that did not require banding.  Repeat EGD in May 2021 and April 2023 showed small varices that did not require banding.      As noted above, she is tolerating the very low-dose carvedilol for now.  It is possible that this is contributing to some of her fatigue.    She apparently had a small lesion in the left lobe of the liver on ultrasound. We reviewed her MRI of the liver with and without contrast from 2/24/2016 at our Tumor Board on 3/28/2016.  Our radiologist did not find a suspicious lesion.  We recommended repeat MRI with and without contrast as well as alpha-fetoprotein.  The repeat MRI did not reveal any suspicious lesions. We tried to get a follow-up chest and abdominal CT. Her insurance only allowed us to get an ultrasound which was stable.  However, the June 2017 MRI suggested one stable lesion and a possible new lesion.  The repeat MRI 6/18/2018 again suggested a right-sided lesion possibly consistent with cholangiocarcinoma (based on the outside radiology read). However, we formally discussed her at the [facility] Tumor Board on 7/9/2018. Our radiologist felt that there were no worrisome lesions seen (other than very severe fibrosis and cirrhosis).  The recommendation was to get alpha-fetoprotein and ultrasound every 6 months.  The patient was not comfortable with that recommendation at that time.  We compromised with ultrasound/alpha-fetoprotein and MRI/alpha-fetoprotein alternating every 6 months if her insurance company would pay for this. 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      We reviewed her July 2019 MRI at our tumor board on 8/5/2019.  The radiologist again felt that the  findings were unchanged and there was nothing suspicious for hepatocellular carcinoma.  Given the fact that we have been following her imaging studies since 2016 and there have been no significant changes, I had recommended again that she go to ultrasound and alpha-fetoprotein every 6 months.  Unfortunately, every ultrasound that is done at [facility] keeps finding different lesions.  They again found another lesion on the ultrasound in February 2020.  She underwent an MRI on 4/7/2020 which did not reveal any worrisome lesions.  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  Her MRI in May 2021 did not show any worrisome lesions. She should continue to get liver imaging every 6 months.  Normally, as noted above, I would just do ultrasound every 6 months.  She can work with Dr. Silver about possibly getting alternate ultrasound and MRI at 6 month intervals as noted above. However, she reports that her last MRI was refused by the insurance company.  For that reason, for now, we will do AFP and ultrasound every 6 months.  We can consider an MRI if there are abnormalities noted on the ultrasound and/or AFP.    She does have a gallbladder polyp which has been relatively stable. We can continue to follow this with the imaging studies.    She had followed up with her pulmonologist about imaging of her chest for the possible nodule.  She was told that the lung lesion is stable and does not need further evaluation.    She is immune to hepatitis A and hepatitis B.    She can get labs every 3 months and see me back every 7 months.      Thank you for allowing me to participate in the care of this patient. Please feel free to contact me with any questions regarding their care.     Sincerely,     William Burns MD, FAASLD, FACG.  Medical Director, Hepatology  [facility]    Professor of Medicine  Division of Gastroenterology and Liver Disease  [facility]  [address] [address]  Phone: [phone]  Fax: [phone].      Cc: Dr. Lee Silver  cc: Dr. Fransisco Marie        This document was generated with a computerized dictation system. Because of  this, there could be errors in grammar and/or content.

## 2024-08-06 NOTE — PATIENT INSTRUCTIONS
Continue to get labs every 3 months.    Get an ultrasound in 3 months.    See me back in clinic in about 7 months.    You can try and taper down on your lactulose dose.  You and your  need to be very careful and make sure that you do not have any change in your mental status.  If you do, start back on the lactulose twice daily.  For now, continue the rifaximin.

## 2024-11-11 ENCOUNTER — TELEPHONE (OUTPATIENT)
Dept: GASTROENTEROLOGY | Facility: CLINIC | Age: 64
End: 2024-11-11
Payer: MEDICARE

## 2025-03-11 ENCOUNTER — OFFICE VISIT (OUTPATIENT)
Dept: GASTROENTEROLOGY | Facility: CLINIC | Age: 65
End: 2025-03-11
Payer: MEDICARE

## 2025-03-11 VITALS
SYSTOLIC BLOOD PRESSURE: 122 MMHG | HEART RATE: 56 BPM | WEIGHT: 142 LBS | HEIGHT: 63 IN | OXYGEN SATURATION: 97 % | BODY MASS INDEX: 25.16 KG/M2 | DIASTOLIC BLOOD PRESSURE: 71 MMHG

## 2025-03-11 DIAGNOSIS — K70.30 ALCOHOLIC CIRRHOSIS OF LIVER WITHOUT ASCITES (MULTI): Primary | ICD-10-CM

## 2025-03-11 DIAGNOSIS — K76.82 HEPATIC ENCEPHALOPATHY: ICD-10-CM

## 2025-03-11 DIAGNOSIS — I85.00 VARICES OF ESOPHAGUS DETERMINED BY ENDOSCOPY (MULTI): ICD-10-CM

## 2025-03-11 PROCEDURE — 99214 OFFICE O/P EST MOD 30 MIN: CPT | Performed by: INTERNAL MEDICINE

## 2025-03-11 PROCEDURE — 1126F AMNT PAIN NOTED NONE PRSNT: CPT | Performed by: INTERNAL MEDICINE

## 2025-03-11 PROCEDURE — 3008F BODY MASS INDEX DOCD: CPT | Performed by: INTERNAL MEDICINE

## 2025-03-11 ASSESSMENT — PAIN SCALES - GENERAL: PAINLEVEL_OUTOF10: 0-NO PAIN

## 2025-03-11 NOTE — PROGRESS NOTES
HEPATOLOGY RETURN PATIENT VISIT    March 11, 2025      Dr. Jose Antonio Zayas      Patient Name:     MARINA GABRIEL  Medical Record Number:  [id number]  YOB: 1960    Dear Dr. Zayas,    I had the pleasure of seeing Marina Gabriel for follow-up evaluation in the [facility] ([facility] office). History and physical examination was performed. Pertinent available laboratory, imaging, pathology results were reviewed.     History of Present Illness:   The patient is a 64 year old white female who is referred for follow-up evaluation of alcoholic cirrhosis.    The patient has a heavy alcohol history. At her peak, she was drinking 8-10 beers a day or 1-2 bottles of wine daily. She reports that in January 2015, she presented to [facility] with confusion, increased abdominal girth, and acute kidney injury. She required large-volume paracentesis, lactulose and rifaximin, and underwent upper endoscopy which revealed esophageal varices. By their report, her MELD score was 19. Apparently, despite medical therapy, she worsened. Eventually she was told that there was no hope and she was set up for hospice and palliative care.    However, over the next several months, she started to get better. She was maintained on diuretics. She was too ill to go to an IOP program. However she started going to AA meetings and got a sponsor and has been sober since January 2015.    At that point, her jaundice essentially resolved. She was maintained long-term on diuretics. Her encephalopathy was well-controlled with rifaximin and lactulose. She did have one episode with severe hyponatremia (sodium decreased to 107) which was apparently attributed to the diuretics. After adjustment in the diuretics, the hyponatremia resolved.    She has actually been able to come off of diuretics completely.     She apparently has had a spot in the left lobe of the liver which has not changed over the last year on  ultrasounds.    Even though she appeared dramatically better than she did one year earlier, she was told that she should check in with a liver transplant center to see if she needed transplant.  For these reasons, she saw me in initial consultation in the liver transplant clinic on 2/2/2016. At that time, we felt that she was too stable for liver transplant evaluation. We attempted to get old records on her and have her just follow-up with me in general Hepatology clinic.    I did speak with Dr. Brothers on 3/23/2016. He told me that he had done a screening EGD which revealed moderate-sized esophageal varies which he banded. After the EGD, she developed chest pain and dysphagia. She had imaging studies of the chest done and she was noted to have a lung lesion and was seen by Pulmonary.  Eventually she had a biopsy of the lesion which was found to be benign.  She has been getting intermittent pulmonary imaging and following-up with her pulmonologist. She was told that her 2020 imaging studies showed no changes.    Dr. Silver has been arranging follow-up imaging studies (see results below). We discussed her at [facility] on 7/9/2018. There were no worrisome lesions seen (other than very severe fibrosis and cirrhosis).  The recommendation was to get alpha-fetoprotein and ultrasound every 6 months.  The patient was not comfortable with that recommendation at that time.  We compromised with ultrasound alpha-fetoprotein and MRI alpha-fetoprotein alternating every 6 months if her insurance company would pay for this. She reported that her labs from 12/2018 showed a MELD of 6.    She continues to follow-up with Dr. Silver.  She reports that she had an EGD in August 2019 that showed varices.  She reports that she required 5 bands.  She did develop significant dysphagia and odynophagia after the procedure.  Repeat endoscopy in September 2019 showed ulcerations at the site of the previous banding but no active varices.  The  endoscopy apparently also revealed food in the stomach and she was diagnosed with gastroparesis (although she had no gastroparesis symptoms).  She was placed on Reglan and was tolerating it well.  She reportedly had another endoscopy in April 2020 which showed small varices that did not need to be banded.    She has remained on rifaximin for several years.  She had gotten tapered off of her lactulose.  However, she then started to have some issues with her memory.  Because of that, she was started on Kristalose twice daily in addition to her rifaximin twice daily.  After that, she felt that her memory issues resolved.      She has developed some point pains and swelling and is now being evaluated for possible rheumatoid arthritis.    She was placed on nadolol due to the esophageal varices.  She developed significant bradycardia and the nadolol was held.  It was eventually restarted at a lower dose (10 mg daily).  She was eventually switched over to Coreg 6.25 mg once daily and is tolerating that well, by her cardiologist.    She returned today for follow-up evaluation.  She feels that her fatigue and confusion have continued to worsen at times.  She is taking lactulose/Kristalose twice daily and rifaximin twice daily.  Her dose was adjusted by her local GI provider.  She was apparently having more bloating on 3 times a day and did not notice any improvement in her mental status.  She has had no gastrointestinal bleeding. She is now on Reglan and lubriprostone.  She remains sober from alcohol for several years. She has some fatigue but is sleeping better.  She is not on any diuretics.      Past Medical/Surgical History:   Alcoholic cirrhosis, rosacea, carpal tunnel syndrome, disc disease, headaches, foot injury, status post left trigger finger repair, concern for rheumatoid arthritis.    Family History:   Father had leukemia and skin CA. GF had CVA. Mom had DM and CAD. There is no known family history of liver  disease or cirrhosis.    Social History:   She is a former smoker. She smoked up to 2 ppd but stopped in 2015. She has a history of heavy alcohol use, as noted above.  She has been sober from alcohol since 2015.  She denied blood transfusions or intravenous drug use. She does have 1 tattoo. She is .    Review of systems: As noted above. She does report insomnia and fatigue.  She does have some abdominal bloating.  She has had the memory issues as noted above.  However, she feels that this has not changed in many years and may just be age rather than hepatic encephalopathy.  Fever, chills, weight loss, visual changes, auditory changes, shortness of breath, chest pain, abdominal pain, GI bleeding, diarrhea, constipation, depression, dysuria, hematuria, musculoskeletal issues, or rash.      Medical, Surgical, Family, and Social Histories  No past medical history on file.    Past Surgical History:   Procedure Laterality Date    OTHER SURGICAL HISTORY  03/28/2016    Ovarian Cystectomy Left       No family history on file.    Social History     Socioeconomic History    Marital status:      Spouse name: Not on file    Number of children: Not on file    Years of education: Not on file    Highest education level: Not on file   Occupational History    Not on file   Tobacco Use    Smoking status: Never    Smokeless tobacco: Never   Substance and Sexual Activity    Alcohol use: Never    Drug use: Never    Sexual activity: Not on file   Other Topics Concern    Not on file   Social History Narrative    Not on file     Social Drivers of Health     Financial Resource Strain: Low Risk  (8/1/2024)    Received from [employer]    Overall Financial Resource Strain (CARDIA)     Difficulty of Paying Living Expenses: Not hard at all   Food Insecurity: No Food Insecurity (8/1/2024)    Received from [employer]    Hunger Vital Sign     Worried About Running Out of Food in the Last Year: Never true     Ran Out of Food in the  Last Year: Never true   Transportation Needs: No Transportation Needs (8/1/2024)    Received from [employer]    PRAPARE - Transportation     Lack of Transportation (Medical): No     Lack of Transportation (Non-Medical): No   Physical Activity: Sufficiently Active (8/1/2024)    Received from [employer]    Exercise Vital Sign     Days of Exercise per Week: 7 days     Minutes of Exercise per Session: 60 min   Stress: Stress Concern Present (8/1/2024)    Received from [employer]    [employer] [employer] - Occupational Stress Questionnaire     Feeling of Stress : To some extent   Social Connections: Socially Integrated (8/1/2024)    Received from [employer]    Social Connection and Isolation Panel [NHANES]     Frequency of Communication with Friends and Family: More than three times a week     Frequency of Social Gatherings with Friends and Family: More than three times a week     Attends [employer] Services: More than 4 times per year     Active Member of Clubs or Organizations: Yes     Attends Club or Organization Meetings: More than 4 times per year     Marital Status:    Intimate Partner Violence: Not At Risk (8/1/2024)    Received from [employer]    Humiliation, Afraid, Rape, and Kick questionnaire     Fear of Current or Ex-Partner: No     Emotionally Abused: No     Physically Abused: No     Sexually Abused: No   Housing Stability: Low Risk  (8/1/2024)    Received from [employer]    Housing Stability Vital Sign     Unable to Pay for Housing in the Last Year: No     Number of Times Moved in the Last Year: 0     Homeless in the Last Year: No       Allergies and Current Medications  Allergies   Allergen Reactions    Levofloxacin Rash     Other reaction(s): pain in body    Sulfamethoxazole-Trimethoprim Hives, Itching and Rash     Other reaction(s): serum sickness     Current Outpatient Medications   Medication Sig Dispense Refill    acetaminophen (Tylenol) 500 mg capsule Take by  "mouth.      ascorbic acid (Vitamin C) 500 mg tablet Take 1 tablet (500 mg) by mouth once daily.      Breo Ellipta 100-25 mcg/dose inhaler Inhale 1 puff once daily.      calcium carbonate-vitamin D3 500 mg-2.5 mcg (100 unit) tablet,chewable Chew.      carvedilol (Coreg) 6.25 mg tablet Take 1 tablet (6.25 mg) by mouth once daily.      cyanocobalamin, vitamin B-12, (Vitamin B-12) 1,000 mcg tablet extended release once every 24 hours.      fluticasone (Flonase) 50 mcg/actuation nasal spray INSTILL 1 SPRAY IN EACH NOSTRIL ONCE A DAY      folic acid (Folvite) 1 mg tablet Take 1 tablet (1 mg) by mouth once daily.      lactulose (Kristalose) 20 gram packet Take 1 packet (20 g) by mouth 3 times a day. (Patient not taking: Reported on 8/6/2024) 90 packet 6    lactulose 20 gram/30 mL oral solution Take 30 mL (20 g) by mouth 4 times a day. Two 30 ml in the am, and two 30ml in the pm.      loratadine (Claritin) 10 mg tablet Take 1 tablet (10 mg) by mouth once daily.      magnesium aspart,citrate,oxide (Triple Magnesium Complex) 400 mg magnesium capsule Take by mouth once daily.      milk thistle seed extract 175 mg tablet as directed Orally      multivitamin tablet Take by mouth.      pantoprazole (ProtoNix) 40 mg EC tablet Take 1 tablet (40 mg) by mouth once daily.      riboflavin (Vitamin B-2) 400 mg tablet Take by mouth.      thiamine (Vitamin B-1) 100 mg tablet Take 1 tablet (100 mg) by mouth once daily.      traMADol (Ultram) 50 mg tablet TAKE ONE TABLET BY MOUTH 1-2 TIMES A DAY AS NEEDED FOR PAIN TO LAST 30 DAYS      Xifaxan 550 mg tablet Oral for 15      Xiidra 5 % dropperette Administer 1 drop into both eyes 2 times a day.      zinc gluconate 50 mg tablet once every 24 hours.       No current facility-administered medications for this visit.        Physical Exam  /71   Pulse 56   Ht 1.6 m (5' 3\")   Wt 64.4 kg (142 lb)   SpO2 97%   BMI 25.15 kg/m²       Physical Examination:   General Appearance: alert and in " no acute distress.   HEENT: oropharynx without lesions. Anicteric sclerae.   Neck supple, nontender, without adenopathy, thyromegaly, or JVD.   Lungs clear to auscultation and percussion.   Heart RRR without murmurs, rubs, or gallops.   Abdomen: Soft, nontender, bowel sounds positive, without obvious ascites. Liver and spleen not palpable.  Extremities full ROM, no atrophy, normal strength. No edema.    Skin no specific lesions.   Neurological exam nonfocal, alert and oriented. No asterixis.  She does have some diffuse spider angiomata, as well as bilateral palmar erythema.     Labs 2/17/2025 sodium 142, glucose 109, creatinine 0.78, protein 7, albumin 3.5, alk phos 114, bilirubin 0.7, AST 77, , INR 1, WBC 8.1, hemoglobin 12.8, platelets 161.    Labs 11/8/2024 AFP 2.9, alk phos 118, AST 47, ALT 45.    Labs 7/1/2024 WBC 3.6, hemoglobin 14.2, platelets 151, INR 1, glucose 97, sodium 141, creatinine 0.7, protein 8.1, albumin 4.4, alk phos 132, bilirubin 0.7, AST 57, ALT 51.    Labs 4/1/2024 AFP 3.    Labs 11/13/2023 sodium 138, creatinine 0.68, glucose 89, protein 7.9, albumin 4.3, alk phos 159, bilirubin 0.9, AST 78, ALT 74, INR 1.    Labs 8/7/2023 alk phos 140, AST 96, ALT 75, AFP 3.    Labs 10/3/2022 WBC 3.7, hemoglobin 13.5, platelets 178, INR 1, glucose 98, sodium 139, creatinine 0.64, protein 7.8, albumin 4.4, alk phos 144, bilirubin 0.6, AST 82, ALT 62, AFP 2.    Labs 7/11/2022 WBC 3.7, hemoglobin 12.9, platelets 133, INR 1.1, sodium 138, glucose 101, creatinine 0.7, protein 7.4, albumin 4.3, alk phos 107, bilirubin 0.7, AST 56, ALT 83, AFP 2.    Labs 4/1/2022 AST 55, ALT 40.    Labs 1/11/2022 WBC 3.3, hemoglobin 11.5, platelets 138, INR 1.1, glucose 89, sodium 137, creatinine 0.63, protein 6.9, albumin 3.8, alk phos 138, bilirubin 0.3, AST 41, ALT 32.    Labs 5/3/2021 WBC 2.5, hemoglobin 13.2, platelets 156, INR 1.1, sodium 129, potassium 4.8, creatinine 0.52, protein 7.6, albumin 4.5, alk phos 156,  bilirubin 0.5, AST 41, ALT 38, AFP 2.    Labs 2/2/2021 sodium 131, potassium 4.6, WBC 3.5    Labs 11/3/2020 INR 1.1, AFP < 4, WBC 5, hemoglobin 14.4, platelets 144, glucose 111, sodium 139, creatinine 0.64, protein 7.3, albumin 4.6, alk phos 93, bilirubin 0.8, AST 23, ALT 18.    Labs 4/2/2020 WBC 3.9, hemoglobin 14.6, platelets 157, INR 1.1, sodium 140, glucose 108, creatinine 0.74, protein 8, albumin 4.4, alkaline phosphatase 114, bilirubin 1.1, AST 46, ALT 29, AFP 3.    Labs 2/25/2020 WBC 3.1, hemoglobin 13.7, platelets 134, INR 1.1, glucose 109, sodium 143, creatinine 0.78, protein 7.7, albumin 4.3, alkaline phosphatase 125, bilirubin 0.6, AST 35, ALT 45.    Labs 6/18/2019 WBC 3.8, hemoglobin 14, platelets 135, INR 1.1, glucose 95, sodium 144, creatinine 0.7, protein 8, albumin 4.5, alkaline phosphatase 148, bilirubin 0.8, AST 41, ALT 37, AFP 3.    Labs 12/20/2018 WBC 3.3, Hgb 14, platelets 127, INR 1.1, glucose 109, creat 0.69, sodium 142, protein 7.5, albumin 4.2, alk phos 159, bili 0.7, AST 43, ALT 42, AFP 2.    Labs 8/13/2018 WBC 4.1, hemoglobin 14.7, platelets 122, INR 0.9, sodium 146, glucose 95, creatinine 0.77, albumin 4.5, protein 7.8, alkaline phosphatase 150, bilirubin 0.6, AST 40, ALT 59.    Labs 6/26/2018 CEA 5.1, CA 19-9 62.    Labs 5/15/2018 WBC 5, hemoglobin 13.7, platelets 128, INR 1.1, sodium 144, potassium 3.7, creatinine 0.72, protein 7.4, albumin 3.6, alkaline phosphatase 147, bilirubin 0.8, AST 30, ALT 42, hepatitis B surface antibody positive, hepatitis A antibody positive.    Labs 2/13/2018 AFP < 1, WBC 4.4, hemoglobin 14.1, platelets 157, INR 1.1, glucose 95, creatinine 0.59, protein 7.6, albumin 4.2, alkaline phosphatase 161, bilirubin 0.6, AST 31, ALT 30.    Labs 7/11/2017 INR 1.1, glucose 80, creatinine 0.55, sodium 138, protein 7.5, albumin 4.1, alkaline phosphatase 157, bilirubin 1, AST 36, ALT 35, WBC 4.7, hemoglobin 14.4, platelets 130.    Labs 1/6/2017 HAV -, HBsAb -, protein 8.1,  albumin 4.5, alk phos 148, bili 1.6, dbili 0.4, AST 44, ALT 42.    Labs 12/21/2016 alpha-fetoprotein 8, creatinine 0.72, sodium 142, platelets 132, WBC 5.9, hemoglobin 16.1, INR 1.1, protein 7.7, albumin 3.6, alkaline phosphatase 270, bilirubin 3.1, AST 45, ALT 55.    Labs 10/13/2016 WBC 4.2, hemoglobin 15.3, platelets 175, glucose 102, creatinine 0.5, sodium 145, protein 7.9, albumin 4.2, alkaline phosphatase 206, bilirubin 0.9, AST 31, ALT 37,    Labs 6/17/2016 sodium 145, potassium 3.9, glucose 106, creatinine 0.7.    Labs 3/28/2016 WBC 4.2, Hgb 14.6, platelets 151, INR 1.2, AFP 4, glucose 99, sodium 140, potassium 4.3, creatinine 0.55, protein 7.6, albumin 3.5, alkaline phosphatase 216, bilirubin 0.7, AST 50, ALT 49.    Labs 3/9/2016 creat 0.58, glucose 109, WBC 3.5, Hgb 13.8, platelets 127.      Doppler ultrasound 11/8/2024:  Impression    1. Patent, antegrade flow of the hepatic and portal vasculature.    2. Portal vein dilation with slow main portal vein velocity suggesting portal hypertension.    3. Elevated hepatic artery resistive index, nonspecific but can be seen in the setting of chronic hepatocellular disease, hepatic venous congestion or could be physiologic due to advanced patient age or a postprandial state.    Ultrasound 10/11/2024:  Impression      1. Cirrhotic morphology of the liver with suggestion of sluggish flow in the portal vein, not fully characterized on current examination. Vascular Doppler ultrasound could be considered for further evaluation.  2. 7 mm probable pedunculated gallbladder polyp. Consider six month follow-up for evaluation of stability.  3. Mild dilatation of the right renal collecting system, which may be due to fluid distention of the bladder among other possibilities. Post void ultrasound could be considered for further evaluation as clinically warranted.      Ultrasound 4/1/2024 revealed cirrhosis, no liver mass, trace ascites, gallbladder wall thickening.    Ultrasound  11/10/2023:  Impression    Constellation of sonographic findings compatible with cirrhosis. Correlate with liver function tests and clinical parameters.  Stable gallbladder polyp measuring up to 0.5 cm. See guidelines below.  Otherwise, no acute sonographic process identified.      Ultrasound 5/26/2023 revealed coarse echogenic liver, no liver mass, gallbladder polyp 8 mm in size.    Ultrasound 6/21/2022 revealed cirrhosis, no definitive focal liver mass, probable small gallbladder polyp.    Ultrasound 11/16/2021 revealed cirrhosis, probable 7 mm gallbladder polyp, otherwise unremarkable right upper quadrant ultrasound.    MRI 5/19/2021:  IMPRESSION:  1.  Technically adequate examination although image degradation  related to motion and shadowing artifact demonstrates findings  compatible with cirrhosis without MRI evidence of hepatoma.  2. Portal systemic venous collaterals including unchanged  paraesophageal varices measuring up to 5 mm.         MRI 4/7/2020:  IMPRESSION:  1. Hepatic cirrhosis with areas of confluent fibrosis. No enhancing  mass to suggest hepatocellular carcinoma.  2. Small hiatal hernia.    Ultrasound 2/25/2020 revealed an isoechoic masslike focus in the left hepatic lobe anteriorly not corresponding to the previously seen lesion, the previously seen lesion in the left hepatic lobe is not identified, 5 mm polypoid lesion in the gallbladder.    PET CT 9/12/2018 showed no obvious evidence of malignancy.    US 8/13/2018 revealed cirrhosis, otherwise unremarkable right upper quadrant ultrasound.    MRI 7/22/2019 revealed wedge-shaped signal abnormality with persistent enhancement in the right hepatic lobe unchanged compared to prior studies likely representing confluent fibrosis.  There is cirrhosis, sequela of portal hypertension and a dysplastic nodule in segment 2, and borderline dilated main pancreatic duct.    MRI with and without contrast 6/18/2018 revealed extensive hepatocellular disease  with multiple regenerating nodules, an irregular area of altered signal with vague enhancement and capsular retraction in the anterolateral aspect of the right lobe (somewhat concerning for cholangiocarcinoma), abnormal signal of the anterior surface of the lateral segment of the left lobe of the liver.  There was also an addendum that was written.  It stated that a comparison was made to the study from 6/14/2017.  He reports that capsular lesion in the right anterolateral lobe was previously visualized was some capsular retraction.  The lesion measures 5.1 x 2.4 cm.  Interestingly, there was no comment on the MRI from June 14, 2017 about a 5.1 cm right sided lesion.    US 2/1/2018:  IMPRESSION:  Liver cirrhosis.     At least 1 immobile echogenic focus in the gallbladder which is  adhered to the gallbladder wall without posterior acoustic shadowing,  likely representing gallbladder polyp. There is no evidence of  gallbladder wall thickening or pericholecystic fluid.    MRI 6/14/2017 revealed cirrhosis, splenomegaly, 1.5 cm anterior left lateral segment lesion similar to previous and remaining indeterminate, focal 1.5 cm area with early arterial enhancement in the anterior left hepatic lobe medial segment.    US 2/1/2017 revealed a normal gallbladder and biliary tree. There is cirrhosis. There is a stable regenerating nodule.    Chest CT 3/16/2016 revealed a faint 1.1 cm right lower lobe density.    MRI 6/22/2016:  IMPRESSION:  1. Cirrhosis without evidence of a lesion suspicious for   hepatocellular carcinoma.    MRI 2/24/2016 revealed cirrhosis, a 1.6 cm lesion in the anterior left lobe of the liver with an indeterminate appearance.    EGD 4/24/2023 showed grade 1 esophageal varices, erythematous gastric mucosa, normal duodenum.  No gastric varices were seen.    EGD 5/10/2021 revealed grade 1 esophageal varices with no stigmata of bleeding, normal stomach, normal duodenum.    EGD 9/2018 revealed medium-sized,  non-bleeding esophageal varices.    EGD 1/12/2017 revealed medium-sized esophageal varices, portal hypertensive gastropathy and normal duodenum.    EGD 3/9/2016 revealed 3 columns of moderate-sized esophageal varices without stigmata of variceal bleeding, PHG, normal duodenum. The varices were banded.    Colonoscopy 8/27/2015 revealed a 3 mm TC polyp and internal hemorrhoids.    Pathology from colonoscopy 8/27/2015 revealed a tubular adenoma without evidence of high-grade dysplasia or malignancy.    PAP smear 7/2/2015 revealed normal cytology.    Mammogram 4/15/2015 normal.         Assessment/Plan:   Alcoholic cirrhosis  Liver abnormalities/lesions on imaging studies    The patient is referred to me for follow-up evaluation of alcoholic cirrhosis as well as the liver lesions.     Back in January 2015, she presented with severely decompensated alcoholic liver disease. I suspect that she had degree of alcoholic hepatitis on top of her underlying alcoholic cirrhosis. At that time, she had multiple complications of her liver disease including ascites, jaundice, hepatic encephalopathy, and variceal bleeding.    After being sober for > 1 year, she improved from the complications of her liver disease. In fact, she has been able to come off of her diuretics. Also, with sobriety, her MELD score decreased significantly. Because of this, she has not needed to be considered for liver transplant.  Her MELD scores have been relatively low.    Her labs were slightly higher in February 2025 but she apparently had some type of respiratory infection and was on steroids and antibiotics.  We will recheck this in 3 months.    She has remained on Xifaxan and lactulose/Kristalose.  I told her that she can titrate this based on her symptoms and bowel movements.  In general, she takes it about twice a day.  Personally, I suspect that her memory issues and encephalopathy are stable.  Obviously, if her encephalopathy worsens, we may need to  readdress our ideas about transplant options.  In fact, when she took it up to 3 times a day she noticed no improvement in fatigue or confusion but did notice worsening bloating and her local gastroenterologist decreased to twice daily.  I agree with this dosage.  In addition, they are putting her on medicines for bloating and I would be afraid to go to higher doses.     She is aware that she needs to pay attention to her mental status.  If it does worsen, she will have to decide on whether she feels that it is safe to drive.  I have spoken with her on previous occasions that she could be held liable if she were to get into an accident.    From a transplant standpoint, her MELD scores have been low.  This would generally be too early for liver transplant consideration.    She will need to continue to follow with you and her gastroenterologist (Dr. Silver).  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  Her 9/2018 EGD revealed medium-sized, non-bleeding varices. 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  However, apparently due to fatigue, her nadolol was decreased to 10 mg daily.  Interestingly, she was found to have varices again that required banding in August 2019.  By the patient's report, repeat endoscopy around April 2020 showed small varices that did not require banding.  Repeat EGD in May 2021 and April 2023 showed small varices that did not require banding.  She can speak to her local gastroenterologist about a repeat around April 2025.  I also told her to check with them to see when she is due for her next colonoscopy.    As noted above, she is tolerating the very low-dose carvedilol for now.  It is possible that this is contributing to some of her fatigue.    She apparently had a small lesion in the left lobe of the liver on ultrasound. We reviewed her MRI of the liver with and without contrast from 2/24/2016 at our Tumor Board on 3/28/2016.  Our radiologist did not find a suspicious lesion.  We  recommended repeat MRI with and without contrast as well as alpha-fetoprotein.  The repeat MRI did not reveal any suspicious lesions. We tried to get a follow-up chest and abdominal CT. Her insurance only allowed us to get an ultrasound which was stable.  However, the June 2017 MRI suggested one stable lesion and a possible new lesion.  The repeat MRI 6/18/2018 again suggested a right-sided lesion possibly consistent with cholangiocarcinoma (based on the outside radiology read). However, we formally discussed her at the [facility] Tumor Board on 7/9/2018. Our radiologist felt that there were no worrisome lesions seen (other than very severe fibrosis and cirrhosis).  The recommendation was to get alpha-fetoprotein and ultrasound every 6 months.  The patient was not comfortable with that recommendation at that time.  We compromised with ultrasound/alpha-fetoprotein and MRI/alpha-fetoprotein alternating every 6 months if her insurance company would pay for this. 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      We reviewed her July 2019 MRI at our tumor board on 8/5/2019.  The radiologist again felt that the findings were unchanged and there was nothing suspicious for hepatocellular carcinoma.  Given the fact that we have been following her imaging studies since 2016 and there have been no significant changes, I had recommended again that she go to ultrasound and alpha-fetoprotein every 6 months.  Unfortunately, every ultrasound that is done at [facility] keeps finding different lesions.  They again found another lesion on the ultrasound in February 2020.  She underwent an MRI on 4/7/2020 which did not reveal any worrisome lesions.  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  Her MRI in May 2021 did not show any worrisome lesions. She should continue to get liver imaging every 6 months.  Normally, as noted above, I would just do ultrasound every 6 months.  She can work with  Dr. Silver about possibly getting alternate ultrasound and MRI at 6 month intervals as noted above. However, she reports that her last MRI was refused by the insurance company.  For that reason, for now, we will do AFP and ultrasound every 6 months.  We can consider an MRI if there are abnormalities noted on the ultrasound and/or AFP.    She does have a gallbladder polyp which has been relatively stable. We can continue to follow this with the imaging studies every 6 months.    She had followed up with her pulmonologist about imaging of her chest for the possible nodule.  She was told that the lung lesion is stable and does not need further evaluation.    She is immune to hepatitis A and hepatitis B.    She can get labs every 3 months and see me back every 7 months.      Thank you for allowing me to participate in the care of this patient. Please feel free to contact me with any questions regarding their care.     Sincerely,     William Burns MD, FAASLD, FACG.  Medical Director, Hepatology  [facility]  [facility]    Professor of Medicine  Division of Gastroenterology and Liver Disease  [employer]    [address] [place] [id number]  Phone: [phone]  Fax: [phone].      Cc: Dr. Lee Silver  cc: Dr. Fransisco Marie        This document was generated with a computerized dictation system. Because of  this, there could be errors in grammar and/or content.   no chest pain and no edema.

## 2025-03-11 NOTE — PATIENT INSTRUCTIONS
Get labs in 3 months and in 6 months.    Get an ultrasound of the liver in about 2 months.    Try to limit your ibuprofen as tolerated.    Follow-up with Dr. Silver for upper endoscopy around April 2025.  You can also ask him if you are due for a colonoscopy.    See me back in clinic in about 7 months.

## 2025-09-04 ENCOUNTER — TELEPHONE (OUTPATIENT)
Dept: GASTROENTEROLOGY | Facility: HOSPITAL | Age: 65
End: 2025-09-04
Payer: MEDICARE